# Patient Record
Sex: FEMALE | NOT HISPANIC OR LATINO | Employment: UNEMPLOYED | URBAN - METROPOLITAN AREA
[De-identification: names, ages, dates, MRNs, and addresses within clinical notes are randomized per-mention and may not be internally consistent; named-entity substitution may affect disease eponyms.]

---

## 2017-03-27 ENCOUNTER — COMMUNICATION - HEALTHEAST (OUTPATIENT)
Dept: TELEHEALTH | Facility: CLINIC | Age: 47
End: 2017-03-27

## 2017-03-27 ENCOUNTER — HOSPITAL ENCOUNTER (OUTPATIENT)
Dept: MAMMOGRAPHY | Facility: CLINIC | Age: 47
Discharge: HOME | End: 2017-03-27

## 2017-03-27 DIAGNOSIS — Z12.31 VISIT FOR SCREENING MAMMOGRAM: ICD-10-CM

## 2017-08-02 ENCOUNTER — APPOINTMENT (OUTPATIENT)
Dept: VASCULAR SURGERY | Facility: CLINIC | Age: 47
End: 2017-08-02
Payer: COMMERCIAL

## 2017-08-02 PROCEDURE — 99207 ZZC VEINSOLUTIONS FREE SCREENING: CPT | Performed by: SURGERY

## 2017-10-03 ENCOUNTER — OFFICE VISIT (OUTPATIENT)
Dept: VASCULAR SURGERY | Facility: CLINIC | Age: 47
End: 2017-10-03
Payer: COMMERCIAL

## 2017-10-03 ENCOUNTER — APPOINTMENT (OUTPATIENT)
Dept: VASCULAR SURGERY | Facility: CLINIC | Age: 47
End: 2017-10-03
Payer: COMMERCIAL

## 2017-10-03 DIAGNOSIS — Z53.9 ERRONEOUS ENCOUNTER--DISREGARD: Primary | ICD-10-CM

## 2017-10-03 PROCEDURE — 93971 EXTREMITY STUDY: CPT | Performed by: SURGERY

## 2017-10-03 PROCEDURE — 99203 OFFICE O/P NEW LOW 30 MIN: CPT | Performed by: SURGERY

## 2017-10-03 NOTE — MR AVS SNAPSHOT
"              After Visit Summary   10/3/2017    Jessica Manzanares    MRN: [id number]           Patient Information     Date Of Birth          1970        Visit Information        Provider Department      10/3/2017 2:00 PM Rinku Beyer MD [facility]      Today's Diagnoses     ERRONEOUS ENCOUNTER--DISREGARD    -  1       Follow-ups after your visit        Who to contact     If you have questions or need follow up information about today's clinic visit or your schedule please contact [facility] directly at [phone].  Normal or non-critical lab and imaging results will be communicated to you by [employer], letter or phone within 4 business days after the clinic has received the results. If you do not hear from us within 7 days, please contact the clinic through [employer] or phone. If you have a critical or abnormal lab result, we will notify you by phone as soon as possible.  Submit refill requests through [employer] or call your pharmacy and they will forward the refill request to us. Please allow 3 business days for your refill to be completed.          Additional Information About Your Visit        MyChart Information     [employer] lets you send messages to your doctor, view your test results, renew your prescriptions, schedule appointments and more. To sign up, go to [email or web address] . Click on \"Log in\" on the left side of the screen, which will take you to the Welcome page. Then click on \"Sign up Now\" on the right side of the page.     You will be asked to enter the access code listed below, as well as some personal information. Please follow the directions to create your username and password.     Your access code is: [email or web address]  Expires: 2018 11:14 AM     Your access code will  in 90 days. If you need help or a new code, please call your [place] clinic or [phone].        Care EveryWhere ID  "    This is your Care EveryWhere ID. This could be used by other organizations to access your [place] medical records  [id number]         Blood Pressure from Last 3 Encounters:   12/11/14 90/52    Weight from Last 3 Encounters:   12/11/14 65 kg (143 lb 3.2 oz)              Today, you had the following     No orders found for display       Primary Care Provider Office Phone # Fax #    Mango Stubbs [phone]       [address] [address]        Equal Access to Services     [facility]: Hadii aad ku hadasho Soomaali, waaxda luqadaha, qaybta kaalmada adeegyada, waxay idiin hayaan adereinaldo lebron . [facility] [phone].    ATENCIÓN: Si habla español, tiene a benavidez disposición servicios gratuitos de asistencia lingüística. [facility] [phone].    We comply with applicable federal civil rights laws and [place] laws. We do not discriminate on the basis of race, color, national origin, age, disability, sex, sexual orientation, or gender identity.            Thank you!     Thank you for choosing [facility]  for your care. Our goal is always to provide you with excellent care. Hearing back from our patients is one way we can continue to improve our services. Please take a few minutes to complete the written survey that you may receive in the mail after your visit with us. Thank you!             Your Updated Medication List - Protect others around you: Learn how to safely use, store and throw away your medicines at www.disposemymeds.org.          This list is accurate as of 10/3/17 11:59 PM.  Always use your most recent med list.                   Brand Name Dispense Instructions for use Diagnosis    butalbital-APAP-caffeine-codeine -11-30 MG per capsule    FIORICET WITH codeine     Take 1 capsule by mouth every 4 hours as needed for headaches or migraine        * IBUPROFEN PO      Take 400 mg by mouth every 4 hours as needed         * ibuprofen 600 MG tablet    ADVIL/MOTRIN    60 tablet    Take 1 tablet (600 mg) by mouth every 6 hours as needed for pain (mild)    Pelvic peritoneal adhesions, female (postoperative) (postinfection), Dyspareunia due to medical condition in female patient, Pelvic pain in female       OMEGA-3 FISH OIL PO      Take 1 g by mouth daily        oxyCODONE-acetaminophen 5-325 MG per tablet    PERCOCET    30 tablet    Take 1-2 tablets by mouth every 4 hours as needed for pain (moderate to severe)    Pelvic peritoneal adhesions, female (postoperative) (postinfection), Dyspareunia due to medical condition in female patient, Pelvic pain in female       SYNTHROID PO      Take 100 mcg by mouth        VITAMIN C PO      Take 1,000 mg by mouth daily        VITAMIN D3 PO      Take 1,000 Units by mouth daily        * Notice:  This list has 2 medication(s) that are the same as other medications prescribed for you. Read the directions carefully, and ask your doctor or other care provider to review them with you.

## 2017-10-03 NOTE — PROGRESS NOTES
[facility]    Jessica Manzanares pain in her distal right posterior thigh, right popliteal fossa and the upper right posterior calf.  The pain seems to be worse after she has stood for long periods of time and improves with elevation of the leg.  The pain is described as a crampy pain as well as achiness which is interfering with her ability to exercise and walk as she wants.  She has not noted significant swelling of her legs but has had to elevate her legs above her heart several times a day to relieve the pain.  She has had no trauma.    She has had right knee meniscectomy in the past.  She has not noted any significant swelling of her knee.    Her past medical history is significant for thyroid disease, hyperlipidemia, endometriosis and migraine headaches    Her past surgical history is significant for right knee arthroscopy, da Nellie hysterectomy, left oophorectomy in the distal past and da Nellie salpingo-oophorectomy procedures) 2014    Medicines: Synthroid, vitamin C, Fioricet with codeine p.r.n., vitamin D3, omega-3 fatty acids and ibuprofen p.r.n.    Allergies: NKA    Social history: She is a nonsmoker and does not use alcohol.    12 point review of systems was completed and was reviewed.  It is significant for back pain and hypothyroidism but is otherwise as noted in the history of present illness and past medical history.    Physical exam  General: Pleasant female in no acute distress.  She is 5 feet 3 inches tall and weighs 148 pounds  HEENT: Normocephalic, atraumatic.  EOMI.  Respiratory: Normal respiratory effort  Cardiovascular: Pulse is regular  Musculoskeletal: Gait and station are normal.  I do not appreciate any deformity of right knee.  There are two scars over her knee from her arthroscopic surgery  Psychiatric: Judgment, insight, mood and affect are normal  Neurologic: Grossly normal  Activities: There is a reticular vein/small varicosity coursing down the medial aspect of the  right popliteal fossa.  I do not appreciate other varicose veins nor do I appreciate significant edema.  There is no significant tenderness in this area and I appreciate no masses.  There are no significant varicosities noted of the left leg.  She has 3+ dorsalis pedis and posterior tibial pulses bilaterally.    Duplex ultrasound of her right lower extremity veins reveals no evidence of deep vein thrombosis or deep vein valve incompetence.  Her right great saphenous vein is competent.  Her right small saphenous vein is competent.  The vein of Giacomini is in competent into the mid thigh.  The anterior accessory saphenous vein is not visualized.    Impression  The pain that she is experiencing is definitely interfering with her activities of daily living and is troubling her significant.  The vein of Giacomini in the area of her complaints is in competent but the vein of Giacomini is not significantly enlarged.  There are no other venous abnormalities noted.    I suggest that she see her orthopedic surgeon to have her knee checked to ensure that this pain is not from a joint abnormality.  If her knee is not the source of the pain and this continues to bother her significant, she would be a candidate for endovenous ablation of the right vein of Giacomini.  She has tried compression hose but simply cannot tolerate them.  She had a reaction to the silicone adhesive and the stocking and could only wear the stocking for three hours.    She will pursue conservative management for another few months, have the knee checked with orthopedic surgeon and then return to see me if this pain persists.    ABBY Beyer M.D.

## 2018-05-14 ENCOUNTER — HOSPITAL ENCOUNTER (OUTPATIENT)
Dept: MAMMOGRAPHY | Facility: CLINIC | Age: 48
Discharge: HOME | End: 2018-05-14

## 2018-05-14 DIAGNOSIS — Z12.31 VISIT FOR SCREENING MAMMOGRAM: ICD-10-CM

## 2020-12-04 ENCOUNTER — HOSPITAL ENCOUNTER (OUTPATIENT)
Dept: MAMMOGRAPHY | Facility: CLINIC | Age: 50
Discharge: HOME | End: 2020-12-04

## 2020-12-04 DIAGNOSIS — Z12.31 VISIT FOR SCREENING MAMMOGRAM: ICD-10-CM

## 2021-07-21 ENCOUNTER — RECORDS - HEALTHEAST (OUTPATIENT)
Dept: ADMINISTRATIVE | Facility: CLINIC | Age: 51
End: 2021-07-21

## 2021-07-25 ENCOUNTER — HEALTH MAINTENANCE LETTER (OUTPATIENT)
Age: 51
End: 2021-07-25

## 2021-09-19 ENCOUNTER — HEALTH MAINTENANCE LETTER (OUTPATIENT)
Age: 51
End: 2021-09-19

## 2022-03-06 ENCOUNTER — HEALTH MAINTENANCE LETTER (OUTPATIENT)
Age: 52
End: 2022-03-06

## 2022-08-21 ENCOUNTER — HEALTH MAINTENANCE LETTER (OUTPATIENT)
Age: 52
End: 2022-08-21

## 2022-11-21 ENCOUNTER — HEALTH MAINTENANCE LETTER (OUTPATIENT)
Age: 52
End: 2022-11-21

## 2023-04-16 ENCOUNTER — HEALTH MAINTENANCE LETTER (OUTPATIENT)
Age: 53
End: 2023-04-16

## 2023-09-17 ENCOUNTER — HEALTH MAINTENANCE LETTER (OUTPATIENT)
Age: 53
End: 2023-09-17